# Patient Record
Sex: MALE | Race: WHITE | NOT HISPANIC OR LATINO | Employment: FULL TIME | URBAN - METROPOLITAN AREA
[De-identification: names, ages, dates, MRNs, and addresses within clinical notes are randomized per-mention and may not be internally consistent; named-entity substitution may affect disease eponyms.]

---

## 2017-11-08 ENCOUNTER — HOSPITAL ENCOUNTER (EMERGENCY)
Facility: HOSPITAL | Age: 37
Discharge: LEFT BEFORE DISCHARGE | End: 2017-11-08

## 2017-11-08 VITALS
HEART RATE: 77 BPM | HEIGHT: 74 IN | OXYGEN SATURATION: 96 % | WEIGHT: 215 LBS | TEMPERATURE: 96.9 F | DIASTOLIC BLOOD PRESSURE: 81 MMHG | SYSTOLIC BLOOD PRESSURE: 135 MMHG | RESPIRATION RATE: 18 BRPM | BODY MASS INDEX: 27.59 KG/M2

## 2017-11-08 RX ORDER — LANSOPRAZOLE 30 MG/1
30 CAPSULE, DELAYED RELEASE ORAL DAILY
COMMUNITY

## 2018-09-18 ENCOUNTER — APPOINTMENT (OUTPATIENT)
Dept: GENERAL RADIOLOGY | Facility: HOSPITAL | Age: 38
End: 2018-09-18

## 2018-09-18 ENCOUNTER — HOSPITAL ENCOUNTER (EMERGENCY)
Facility: HOSPITAL | Age: 38
Discharge: HOME | End: 2018-09-18
Attending: EMERGENCY MEDICINE | Admitting: EMERGENCY MEDICINE

## 2018-09-18 VITALS
OXYGEN SATURATION: 100 % | HEIGHT: 74 IN | WEIGHT: 215 LBS | TEMPERATURE: 98.9 F | DIASTOLIC BLOOD PRESSURE: 83 MMHG | BODY MASS INDEX: 27.59 KG/M2 | SYSTOLIC BLOOD PRESSURE: 131 MMHG | HEART RATE: 89 BPM | RESPIRATION RATE: 16 BRPM

## 2018-09-18 DIAGNOSIS — S39.012A LUMBOSACRAL STRAIN, INITIAL ENCOUNTER: Primary | ICD-10-CM

## 2018-09-18 PROCEDURE — 72110 X-RAY EXAM L-2 SPINE 4/>VWS: CPT

## 2018-09-18 PROCEDURE — 99283 EMERGENCY DEPT VISIT LOW MDM: CPT

## 2018-09-18 RX ORDER — ORPHENADRINE CITRATE 100 MG/1
100 TABLET, EXTENDED RELEASE ORAL 2 TIMES DAILY
Qty: 20 TABLET | Refills: 0 | Status: SHIPPED | OUTPATIENT
Start: 2018-09-18

## 2018-09-18 RX ORDER — DICLOFENAC SODIUM 75 MG/1
75 TABLET, DELAYED RELEASE ORAL 2 TIMES DAILY
Qty: 20 TABLET | Refills: 0 | OUTPATIENT
Start: 2018-09-18 | End: 2019-05-23

## 2018-09-18 NOTE — ED PROVIDER NOTES
Pt presents to the ED c/o lower back pain that began 4 days ago. Pt states he was bent over putting his son in the car seat when the pain began. Pt denies numbness in legs, bowel or urinary incontinence .     On exam,  Heart and lungs normal  Mild right sided tenderness over SI joint  No saddle anaesthesia, sensation intact  SLR negative    Plan: Review XR and UA.     Attestation:  The [facility] and I have discussed this patient's history, physical exam, and treatment plan.  I have reviewed the documentation and personally had a face to face interaction with the patient. I affirm the documentation and agree with the treatment and plan.  The attached note describes my personal findings.      Documentation assistance provided by yandel Borrero for Dr. Winn. Information recorded by the scribe was done at my direction and has been verified and validated by me.     Joy Borrero  09/18/18 9300       Leon Winn MD  09/18/18 2387

## 2018-09-18 NOTE — ED NOTES
Pt states back pain; was seen by his PCP and Rx'd Ibuprofen and Flexeril with no relief.     Annette Lane  09/18/18 2408

## 2018-09-18 NOTE — ED NOTES
Pt c/o low back pain x's 4 days after bending over to put his son in the car seat. Pt was seen at PCP for this and has taken medications for this with no relief. Pt also seen chiropractor yesterday.      Lillie Bello RN  09/18/18 1062

## 2018-09-18 NOTE — DISCHARGE INSTRUCTIONS
Gentle activities as tolerated.  Warm moist compresses or cold compresses to the areas of pain, whichever gives you the most relief.  Recheck with your primary care doctor in 1 week if not improving.  Return to the emergency department for bowel or bladder incontinence, urinary retention, lower extremity weakness, groin or genital numbness or tingling, any concerns.

## 2019-05-23 ENCOUNTER — HOSPITAL ENCOUNTER (EMERGENCY)
Facility: HOSPITAL | Age: 39
Discharge: HOME | End: 2019-05-23
Attending: EMERGENCY MEDICINE | Admitting: EMERGENCY MEDICINE

## 2019-05-23 VITALS
BODY MASS INDEX: 27.59 KG/M2 | HEART RATE: 103 BPM | SYSTOLIC BLOOD PRESSURE: 134 MMHG | WEIGHT: 215 LBS | OXYGEN SATURATION: 99 % | TEMPERATURE: 98.4 F | DIASTOLIC BLOOD PRESSURE: 76 MMHG | RESPIRATION RATE: 16 BRPM | HEIGHT: 74 IN

## 2019-05-23 DIAGNOSIS — S39.012A STRAIN OF LUMBAR REGION, INITIAL ENCOUNTER: Primary | ICD-10-CM

## 2019-05-23 PROCEDURE — 99282 EMERGENCY DEPT VISIT SF MDM: CPT

## 2019-05-23 RX ORDER — IBUPROFEN 600 MG/1
600 TABLET ORAL EVERY 6 HOURS PRN
Qty: 15 TABLET | Refills: 0 | Status: SHIPPED | OUTPATIENT
Start: 2019-05-23

## 2019-05-23 RX ORDER — CYCLOBENZAPRINE HCL 10 MG
10 TABLET ORAL 3 TIMES DAILY PRN
Qty: 15 TABLET | Refills: 0 | Status: SHIPPED | OUTPATIENT
Start: 2019-05-23

## 2019-05-24 ENCOUNTER — TREATMENT (OUTPATIENT)
Dept: PHYSICAL THERAPY | Facility: CLINIC | Age: 39
End: 2019-05-24

## 2019-05-24 ENCOUNTER — TRANSCRIBE ORDERS (OUTPATIENT)
Dept: PHYSICAL THERAPY | Facility: CLINIC | Age: 39
End: 2019-05-24

## 2019-05-24 DIAGNOSIS — M54.5 LOW BACK PAIN, UNSPECIFIED BACK PAIN LATERALITY, UNSPECIFIED CHRONICITY, WITH SCIATICA PRESENCE UNSPECIFIED: Primary | ICD-10-CM

## 2019-05-24 DIAGNOSIS — S39.012D STRAIN OF LUMBAR REGION, SUBSEQUENT ENCOUNTER: Primary | ICD-10-CM

## 2019-05-24 PROCEDURE — 97014 ELECTRIC STIMULATION THERAPY: CPT | Performed by: PHYSICAL THERAPIST

## 2019-05-24 PROCEDURE — 97110 THERAPEUTIC EXERCISES: CPT | Performed by: PHYSICAL THERAPIST

## 2019-05-24 PROCEDURE — 97161 PT EVAL LOW COMPLEX 20 MIN: CPT | Performed by: PHYSICAL THERAPIST
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
## 2019-05-31 ENCOUNTER — TREATMENT (OUTPATIENT)
Dept: PHYSICAL THERAPY | Facility: CLINIC | Age: 39
End: 2019-05-31

## 2019-05-31 DIAGNOSIS — S39.012D STRAIN OF LUMBAR REGION, SUBSEQUENT ENCOUNTER: Primary | ICD-10-CM

## 2019-05-31 PROCEDURE — 97110 THERAPEUTIC EXERCISES: CPT | Performed by: PHYSICAL THERAPIST

## 2019-05-31 PROCEDURE — 97014 ELECTRIC STIMULATION THERAPY: CPT | Performed by: PHYSICAL THERAPIST

## 2019-05-31 NOTE — PROGRESS NOTES
"Physical Therapy Progress Note    5/31/2019  Marleny Gudino, BRIAN    Re: Dirk Barahona  ________________________________________________________________    Mr. Dirk Barahona, has attended 2/3 PT sessions.  Treatment has consisted of: patient education, therapeutic activity, therapeutic exercise, and modalities.    S: Mr. Dirk Barahona states: \"My back is feeling pretty good.  It's getting better.  I feel about 80% back to normal.  I still have some pain when I stand back up from a forward bent position. The pain is mainly concentrated in my right low back now instead of all over.\"    Subjective     Objective       Tenderness     Additional Tenderness Details  Neg TTP lumbosacral region    Active Range of Motion     Lumbar   Flexion: 120 degrees   Extension: 32 (discomfort (R) lumbar) degrees   Left lateral flexion: 35 degrees   Right lateral flexion: 35 degrees     Additional Active Range of Motion Details  Lumbar AROM largely painfree     See Exercise, Manual, and Modality Logs for complete treatment.   *Initiated lumbar flexion stretch kneeling - center and (L) bias for (R) lumbar stretch  *Initiated PPT    Assessment & Plan     Assessment  Assessment details: Patient has responded positively to initial PT interventions and demonstrates good improvements in lumbar AROM which are now WNL and mostly painfree save for minor discomfort at end range lumbar extension.  Symptoms have localized to (R) lumbar region, indicative of progressing as expected through healing phases.  He may benefit from a short course of continued PT for progression to core and LE strengthening to maximize safety and function with return to work activities.        Awaiting MD orders - patient to f/u with MD on Monday, 06/03/19.         Manual Therapy:         mins  97482;  Therapeutic Exercise:    28     mins  15011;     Neuromuscular Lori:        mins  26298;    Therapeutic Activity:          mins  50920;     Gait Training:           " mins  35190;     Ultrasound:          mins  84770;    Electrical Stimulation:    15     mins  81814 ( );  Dry Needling          mins self-pay    Timed Treatment:   28   mins   Total Treatment:     44   mins    Karina Spencer PT, DPT  Physical Therapist  [place] License # [id number]

## 2019-06-10 ENCOUNTER — TREATMENT (OUTPATIENT)
Dept: PHYSICAL THERAPY | Facility: CLINIC | Age: 39
End: 2019-06-10

## 2019-06-10 DIAGNOSIS — S39.012D STRAIN OF LUMBAR REGION, SUBSEQUENT ENCOUNTER: Primary | ICD-10-CM

## 2019-06-10 PROCEDURE — 97112 NEUROMUSCULAR REEDUCATION: CPT | Performed by: PHYSICAL THERAPIST

## 2019-06-10 PROCEDURE — 97110 THERAPEUTIC EXERCISES: CPT | Performed by: PHYSICAL THERAPIST

## 2019-06-10 PROCEDURE — 97014 ELECTRIC STIMULATION THERAPY: CPT | Performed by: PHYSICAL THERAPIST

## 2019-06-10 NOTE — PROGRESS NOTES
Physical Therapy Daily Progress Note          Subjective  Patient reports that the back is sore, rates the pain at 7/10.    Objective   See Exercise, Manual, and Modality Logs for complete treatment.       Assessment/Plan  Added KT to the lumbar spine pvms to help with the S/S's.  Subjective reports remain elevated.  Patient performed the exercise routine without increased c/o pain or discomfort in the lumbar spine.                     Manual Therapy:    0     mins  84251;  Therapeutic Exercise:    25     mins  57710;     Neuromuscular Lori:    8    mins  90036;    Therapeutic Activity:     0     mins  48613;     Gait Trainin     mins  57482;     Ultrasound:     0     mins  70359;    Work Hardening           0      mins 21135  Iontophoresis               0   mins 40301    Timed Treatment:   33   mins   Total Treatment:     48   mins    Madhu Tee, PT  Physical Therapist

## 2019-06-14 ENCOUNTER — TREATMENT (OUTPATIENT)
Dept: PHYSICAL THERAPY | Facility: CLINIC | Age: 39
End: 2019-06-14

## 2019-06-14 DIAGNOSIS — S39.012D STRAIN OF LUMBAR REGION, SUBSEQUENT ENCOUNTER: Primary | ICD-10-CM

## 2019-06-14 PROCEDURE — 97110 THERAPEUTIC EXERCISES: CPT | Performed by: PHYSICAL THERAPIST

## 2019-06-14 PROCEDURE — 97112 NEUROMUSCULAR REEDUCATION: CPT | Performed by: PHYSICAL THERAPIST

## 2019-06-14 PROCEDURE — 97014 ELECTRIC STIMULATION THERAPY: CPT | Performed by: PHYSICAL THERAPIST

## 2019-07-17 ENCOUNTER — DOCUMENTATION (OUTPATIENT)
Dept: PHYSICAL THERAPY | Facility: CLINIC | Age: 39
End: 2019-07-17

## 2021-08-26 ENCOUNTER — TRANSCRIBE ORDERS (OUTPATIENT)
Dept: ADMINISTRATIVE | Facility: HOSPITAL | Age: 41
End: 2021-08-26

## 2021-08-26 DIAGNOSIS — E29.1 MALE HYPOGONADISM: Primary | ICD-10-CM

## 2025-05-08 VITALS
HEART RATE: 72 BPM | SYSTOLIC BLOOD PRESSURE: 133 MMHG | DIASTOLIC BLOOD PRESSURE: 74 MMHG | OXYGEN SATURATION: 95 % | RESPIRATION RATE: 10 BRPM | HEART RATE: 65 BPM | OXYGEN SATURATION: 96 % | DIASTOLIC BLOOD PRESSURE: 68 MMHG | SYSTOLIC BLOOD PRESSURE: 120 MMHG | SYSTOLIC BLOOD PRESSURE: 125 MMHG | DIASTOLIC BLOOD PRESSURE: 76 MMHG | SYSTOLIC BLOOD PRESSURE: 124 MMHG | RESPIRATION RATE: 15 BRPM | TEMPERATURE: 97.9 F | DIASTOLIC BLOOD PRESSURE: 64 MMHG | RESPIRATION RATE: 7 BRPM | OXYGEN SATURATION: 97 % | HEIGHT: 74 IN | RESPIRATION RATE: 16 BRPM | DIASTOLIC BLOOD PRESSURE: 75 MMHG | DIASTOLIC BLOOD PRESSURE: 84 MMHG | SYSTOLIC BLOOD PRESSURE: 129 MMHG | TEMPERATURE: 98 F | SYSTOLIC BLOOD PRESSURE: 116 MMHG | HEART RATE: 90 BPM | DIASTOLIC BLOOD PRESSURE: 105 MMHG | WEIGHT: 256 LBS | HEART RATE: 77 BPM | OXYGEN SATURATION: 100 % | OXYGEN SATURATION: 98 % | HEART RATE: 76 BPM | HEART RATE: 75 BPM | HEART RATE: 79 BPM | RESPIRATION RATE: 14 BRPM

## 2025-05-13 ENCOUNTER — AMBULATORY SURGICAL CENTER (AMBULATORY)
Dept: URBAN - METROPOLITAN AREA SURGERY 17 | Facility: SURGERY | Age: 45
End: 2025-05-13
Payer: COMMERCIAL

## 2025-05-13 ENCOUNTER — OFFICE (AMBULATORY)
Dept: URBAN - METROPOLITAN AREA PATHOLOGY 4 | Facility: PATHOLOGY | Age: 45
End: 2025-05-13
Payer: COMMERCIAL

## 2025-05-13 DIAGNOSIS — K20.0 EOSINOPHILIC ESOPHAGITIS: ICD-10-CM

## 2025-05-13 DIAGNOSIS — K29.80 DUODENITIS WITHOUT BLEEDING: ICD-10-CM

## 2025-05-13 DIAGNOSIS — K31.89 OTHER DISEASES OF STOMACH AND DUODENUM: ICD-10-CM

## 2025-05-13 DIAGNOSIS — K21.00 GASTRO-ESOPHAGEAL REFLUX DISEASE WITH ESOPHAGITIS, WITHOUT B: ICD-10-CM

## 2025-05-13 PROBLEM — K29.70 GASTRITIS, UNSPECIFIED, WITHOUT BLEEDING: Status: ACTIVE | Noted: 2025-05-13

## 2025-05-13 PROBLEM — K22.89 OTHER SPECIFIED DISEASE OF ESOPHAGUS: Status: ACTIVE | Noted: 2025-05-13

## 2025-05-13 PROCEDURE — 43239 EGD BIOPSY SINGLE/MULTIPLE: CPT | Performed by: INTERNAL MEDICINE

## 2025-05-13 PROCEDURE — 88305 TISSUE EXAM BY PATHOLOGIST: CPT | Performed by: PATHOLOGY

## 2025-05-13 NOTE — SERVICEHPINOTES
Patient is a 44-year-old male here today for evaluation of reflux.He was previously well-controlled on lansoprazole 30 mg once daily. They increase this to twice daily due to breakthrough reflux but insurance has denied this. He did take twice daily for a bit of time and notes significant improvement in his reflux. He denies any dysphagia, melena, unexplained weight loss, or early satiety. He did have dysphagia as a child requiring dilation but again no issues since. He has not had an EGD in many, many years.He is having regular bowels. He denies blood in his stool. He has no family history of colorectal cancer. He has never had a colonoscopy in the past.He has no recent lab work or imaging for review.

## 2025-06-25 ENCOUNTER — OFFICE (AMBULATORY)
Dept: URBAN - METROPOLITAN AREA CLINIC 76 | Facility: CLINIC | Age: 45
End: 2025-06-25
Payer: COMMERCIAL

## 2025-06-25 VITALS
SYSTOLIC BLOOD PRESSURE: 110 MMHG | HEART RATE: 86 BPM | WEIGHT: 252 LBS | DIASTOLIC BLOOD PRESSURE: 69 MMHG | HEIGHT: 74 IN

## 2025-06-25 DIAGNOSIS — K21.00 GASTRO-ESOPHAGEAL REFLUX DISEASE WITH ESOPHAGITIS, WITHOUT B: ICD-10-CM

## 2025-06-25 PROCEDURE — 99213 OFFICE O/P EST LOW 20 MIN: CPT
